# Patient Record
Sex: FEMALE | Race: WHITE | NOT HISPANIC OR LATINO
[De-identification: names, ages, dates, MRNs, and addresses within clinical notes are randomized per-mention and may not be internally consistent; named-entity substitution may affect disease eponyms.]

---

## 2022-03-10 PROBLEM — Z00.00 ENCOUNTER FOR PREVENTIVE HEALTH EXAMINATION: Status: ACTIVE | Noted: 2022-03-10

## 2022-03-14 ENCOUNTER — APPOINTMENT (OUTPATIENT)
Dept: ENDOCRINOLOGY | Facility: CLINIC | Age: 57
End: 2022-03-14

## 2022-11-10 ENCOUNTER — OUTPATIENT (OUTPATIENT)
Dept: OUTPATIENT SERVICES | Facility: HOSPITAL | Age: 57
LOS: 1 days | End: 2022-11-10

## 2022-11-10 ENCOUNTER — APPOINTMENT (OUTPATIENT)
Dept: OBGYN | Facility: HOSPITAL | Age: 57
End: 2022-11-10

## 2022-11-10 VITALS
TEMPERATURE: 97.7 F | WEIGHT: 155 LBS | DIASTOLIC BLOOD PRESSURE: 70 MMHG | BODY MASS INDEX: 32.54 KG/M2 | HEART RATE: 100 BPM | HEIGHT: 58 IN | SYSTOLIC BLOOD PRESSURE: 125 MMHG

## 2022-11-10 DIAGNOSIS — E78.00 PURE HYPERCHOLESTEROLEMIA, UNSPECIFIED: ICD-10-CM

## 2022-11-10 DIAGNOSIS — N95.1 MENOPAUSAL AND FEMALE CLIMACTERIC STATES: ICD-10-CM

## 2022-11-10 PROCEDURE — 99203 OFFICE O/P NEW LOW 30 MIN: CPT | Mod: GE

## 2022-11-11 PROBLEM — E78.00 HYPERCHOLESTEROLEMIA: Status: ACTIVE | Noted: 2022-11-11

## 2022-11-11 PROBLEM — N95.1 PERIMENOPAUSE: Status: ACTIVE | Noted: 2022-11-11

## 2022-11-11 RX ORDER — ROSUVASTATIN CALCIUM 5 MG/1
TABLET, FILM COATED ORAL
Refills: 0 | Status: ACTIVE | COMMUNITY

## 2022-11-11 NOTE — COUNSELING
[Body Image] : body image [Vitamins/Supplements] : vitamins/supplements [Breast Self Exam] : breast self exam [Confidentiality] : confidentiality [Medication Management] : medication management [Other ___] : [unfilled]

## 2022-11-14 DIAGNOSIS — N95.1 MENOPAUSAL AND FEMALE CLIMACTERIC STATES: ICD-10-CM

## 2022-11-19 NOTE — PLAN
[FreeTextEntry1] : 58 y/o  LMP: 2022 presents to clinic today for consultation regarding perimenopausal symptoms. Patient states that her symptoms of hot flashes, headaches, and irritability were worse in July and August of this year, though they have since subsided and she has not had a period since September. Possibility of patient being perimenopausal was discussed and hormonal options such as Prempro, Combipatch, and Estring were reviewed as well as side effects/ contraindications. Option for use of SSRIs for irritability were also reviewed with benefits and side effects discussed. option to also do expectant management was discussed and patient chose this option for her management at this time. Patient states that she would like to transfer her care to our clinic and was informed to transfer her records to us. \par \par Patient to RTC if vasomotor symptoms return or if she has not had a period for at least 1 year. \par \par Discussed with Dr. Alexander,\par \par Linnea Freitas, PGY2

## 2022-11-19 NOTE — REASON FOR VISIT
[Consultation] : consultation for [Menopausal Symptoms] : menopausal symptoms [FreeTextEntry2] : perimenopausal symptoms

## 2022-11-19 NOTE — HISTORY OF PRESENT ILLNESS
[Hot Flashes] : hot flashes [Night Sweats] : night sweats [No] : none [TextBox_6] : menopausal symptoms have subsided at this time.  [FreeTextEntry1] : 58 y/o  LMP: 2022 presents to clinic today for consultation regarding perimenopausal symptoms. Patient seeks regular GYN care from Dr. Arevalo in [place]. Patient states that she still gets periods, however, she has started to miss a few months. The patient's [age]ar[age] 13.5 y.o. Patient states that she previously had irregular periods until her 40s. She then began to have periods every month lasting about 5 days on average. She reports painful periods with heavy bleeding for the first few days. Her period was absent for the months of July and 2022 and she experienced severe hot flashes, mood changes, and worsening headaches during this period. Patient states that these symptoms were intolerable, though they eventually subsided in September when her menses returned. Patient has not had a period since September. Her last GYN annual visit was in 2022. Patient was instructed by her daughter (psychiatry resident in [place]) to seek a second opinion regarding interventions for vasomotor symptoms related to perimenopause. Patient spoke to her gyn who recommended a supplementation called "Bonafide", a nonhormonal supplementation used to treat menopausal symptoms.\par \par OB Hx:  x2\par GYN Hx: hx of irregular periods, possible endometriosis as well as known fibroids and ovarian cysts that are stable. Denies STDs, abnormal pap smears or abnormal mammograms in past. denies hx of gyn cancers\par Fam Hx: denies fam hx of gyn cancers\par Med Hx: Hypercholesterolemia\par Surg Hx: denies\par Meds: Rosuvastatin\par All: NKDA\par Soc Hx: Soc alcohol use, never used tobacco or drugs\par Psych Hx: no hx of anxiety or depression\par \par #HM\par -Pap: NILM HPV neg 2022 per patient\par -Mammo: Aug 2020 wnl per patient, due this year\par -Colonoscopy:  wnl small polyp removed. return in 5 years per patient

## 2022-11-22 DIAGNOSIS — Z53.20 PROCEDURE AND TREATMENT NOT CARRIED OUT BECAUSE OF PATIENT'S DECISION FOR UNSPECIFIED REASONS: ICD-10-CM

## 2023-04-25 ENCOUNTER — APPOINTMENT (OUTPATIENT)
Dept: ORTHOPEDIC SURGERY | Facility: CLINIC | Age: 58
End: 2023-04-25
Payer: COMMERCIAL

## 2023-04-25 VITALS — BODY MASS INDEX: 30.43 KG/M2 | HEIGHT: 60 IN | WEIGHT: 155 LBS

## 2023-04-25 DIAGNOSIS — M50.90 CERVICAL DISC DISORDER, UNSPECIFIED, UNSPECIFIED CERVICAL REGION: ICD-10-CM

## 2023-04-25 PROCEDURE — 72040 X-RAY EXAM NECK SPINE 2-3 VW: CPT

## 2023-04-25 PROCEDURE — 99203 OFFICE O/P NEW LOW 30 MIN: CPT

## 2023-04-25 RX ORDER — METHOCARBAMOL 750 MG/1
750 TABLET, FILM COATED ORAL
Qty: 30 | Refills: 0 | Status: ACTIVE | COMMUNITY
Start: 2023-04-25 | End: 1900-01-01

## 2023-04-25 RX ORDER — IBUPROFEN 800 MG/1
800 TABLET, FILM COATED ORAL 3 TIMES DAILY
Qty: 60 | Refills: 2 | Status: ACTIVE | COMMUNITY
Start: 2023-04-25 | End: 1900-01-01

## 2023-04-25 NOTE — HISTORY OF PRESENT ILLNESS
[7] : 7 [4] : 4 [Dull/Aching] : dull/aching [Sharp] : sharp [Intermittent] : intermittent [Nothing helps with pain getting better] : Nothing helps with pain getting better [de-identified] : 58 yo F here for eval of BL neck/ shoulder pain for years. Pt has a tight constant pain in her neck and shoulders. Pt denies injury. Feels tight/ stiff. Pt denies N/T. takes advil prn.\par \par Has done Chiro in past with some help [] : no

## 2023-04-25 NOTE — PHYSICAL EXAM
[] : normal deep tendon reflexes bilateral upper extremities [Straightening consistent with spasm] : Straightening consistent with spasm [Disc space narrowing] : Disc space narrowing

## 2023-06-06 ENCOUNTER — APPOINTMENT (OUTPATIENT)
Dept: ORTHOPEDIC SURGERY | Facility: CLINIC | Age: 58
End: 2023-06-06